# Patient Record
Sex: MALE | Race: WHITE | Employment: FULL TIME | URBAN - METROPOLITAN AREA
[De-identification: names, ages, dates, MRNs, and addresses within clinical notes are randomized per-mention and may not be internally consistent; named-entity substitution may affect disease eponyms.]

---

## 2022-04-03 ENCOUNTER — OFFICE VISIT (OUTPATIENT)
Dept: URGENT CARE | Facility: URGENT CARE | Age: 52
End: 2022-04-03
Payer: COMMERCIAL

## 2022-04-03 VITALS
DIASTOLIC BLOOD PRESSURE: 80 MMHG | OXYGEN SATURATION: 99 % | SYSTOLIC BLOOD PRESSURE: 129 MMHG | HEART RATE: 61 BPM | TEMPERATURE: 98.3 F | RESPIRATION RATE: 16 BRPM

## 2022-04-03 DIAGNOSIS — R19.7 DIARRHEA OF PRESUMED INFECTIOUS ORIGIN: Primary | ICD-10-CM

## 2022-04-03 PROCEDURE — 99203 OFFICE O/P NEW LOW 30 MIN: CPT | Performed by: FAMILY MEDICINE

## 2022-04-03 NOTE — PATIENT INSTRUCTIONS
Collect stool sample    Start clear liquid diet-water, Gatorade, ginger ale, Sprite, 7-Up, broth, Jell-O, popsicles until a.m.    Starting tomorrow start the BRAT diet Bananas Rice Applesauce and Toast--can also try other bland foods as tolerated crackers, chicken soup, scrambled eggs-cooked in light butter, no heavy oils/grease    No dairy products, no heavy foods, no spicy foods, no fried foods until diarrhea resolves    Abdominal pain worsens, fever,  start to vomit and can not keep anything down, , bloody stool or black stools to ER        Patient Education     Diet for Vomiting or Diarrhea (Adult)     Your symptoms may return or get worse after eating certain foods listed below. If this happens, stop eating these foods until your symptoms ease and you feel better.  Once the vomiting stops, follow the steps below.   During the first 12 to 24 hours  During the first 12 to 24 hours, follow this diet:    Drinks. Plain water, sport drinks like electrolyte solutions, drinks without caffeine, mineral water (plain or flavored), and clear fruit juices. Don't have drinks with caffeine or citrus juices. This is because they are high in acid and can irritate your stomach.    Soups. Clear broth.    Desserts. Plain gelatin, frozen ice pops, and fruit juice bars without pieces of fruit. As you feel better, you may add 6 to 8 ounces of yogurt per day. If you have diarrhea, don't have foods or drinks with sugar, high-fructose corn syrup, or sugar alcohols.  During the next 24 hours  During the next 24 hours you may add the following to the above:    Hot cereal, plain toast, bread, rolls, and crackers    Plain noodles, rice, mashed potatoes, and chicken noodle or rice soup    Unsweetened canned fruit (but not pineapple) and bananas  Don't eat more than 15 grams of fat a day. Do this by staying away from margarine, butter, oils, mayonnaise, sauces, gravies, fried foods, peanut butter, meat, poultry, and fish.  Don't eat much  fiber. Stay away from raw or cooked vegetables, fresh fruits (except bananas), and bran cereals.  Limit how much caffeine and chocolate you have. Do' use any spices or seasonings except salt.  During the next 24 hours  Slowly go back to your normal diet, as you feel better and your symptoms ease.  [employer] last reviewed this educational content on 9/1/2019 2000-2021 [employer]. All rights reserved. This information is not intended as a substitute for professional medical care. Always follow your healthcare professional's instructions.

## 2022-04-03 NOTE — PROGRESS NOTES
Patient presents with:  Urgent Care  Gastric Problem: Recent travel to [place]-Diarrhea, lathargic and stomach ache        Subjective     Jassi Bonilla is a 51 year old male who presents to clinic today for the following health issues:    HPI       Diarrhea      Duration: 5 days-onset in [place]    Description:       Consistency of stool: loose-less loose first stool in am        Blood in stool: no, no melena        Number of loose stools past 24 hours: 5-6    Intensity:  moderate    Accompanying signs and symptoms:       Fever: no - fever the first 3 days -low grade/chills        Nausea/vomitting: YES- emesis first night, no nausea        Abdominal pain: no        Weight loss: no     History (recent antibiotics or travel/ill contacts/med changes/testing done): [facility] lab test for covid, negative, test prior to leaving was negative     Precipitating or alleviating factors: None    Therapies tried and outcome: eating regular foods         Past Medical History:   Diagnosis Date     Allergic rhinitis, cause unspecified     claritin seems to help     Social History     Tobacco Use     Smoking status: Never Smoker     Smokeless tobacco: Never Used   Substance Use Topics     Alcohol use: Yes     Comment: rare       Current Outpatient Medications   Medication Sig Dispense Refill     atovaquone-proguanil (MALARONE) 250-100 MG per tablet Take 1 tablet by mouth daily. Start 2 days before travel and continue 7 days after return.   (Patient not taking: Reported on 4/3/2022) 18 tablet 0     ciprofloxacin (CIPRO) 500 MG tablet Take 1 tablet by mouth 2 times daily. (Patient not taking: Reported on 4/3/2022) 6 tablet 0     Allergies   Allergen Reactions     Amoxicillin              ROS are negative, except as otherwise noted HPI      Objective    /80   Pulse 61   Temp 98.3  F (36.8  C) (Oral)   Resp 16   SpO2 99%   There is no height or weight on file to calculate BMI.  Physical Exam   GENERAL:, alert and no  distress  ABDOMEN: soft, nontender, no hepatosplenomegaly, no masses and bowel sounds normal  MS: no gross musculoskeletal defects noted, no edema  NEURO: Normal strength and tone, mentation intact and speech normal      Diagnostic Test Results:  Labs reviewed in Epic  No results found for any visits on 04/03/22.        ASSESSMENT/PLAN:      ICD-10-CM    1. Diarrhea of presumed infectious origin  R19.7 Clostridium difficile Toxin B PCR     Ova and Parasite Exam Routine     Enteric Bacteria and Virus Panel by ECHO Stool     Clostridium difficile Toxin B PCR     Ova and Parasite Exam Routine     Enteric Bacteria and Virus Panel by ECHO Stool         Reviewed medication instructions and side effects. Follow up if experiences side effects.     I reviewed supportive care, otc meds to use if needed, expected course, and signs of concern.  Follow up as needed or if he does not improve within  1-2 days or if worsens in any way.  Reviewed red flag symptoms and is to go to the ER if experiences any of these.     The use of Dragon/[employer] services may have been used to construct the content in this note; any grammatical or spelling errors are non-intentional. Please contact the author of this note directly if you are in need of any clarification.      On the day of the encounter, time spend on chart review, patient visit, review of testing, documentation was 30  minutes          Patient Instructions     Collect stool sample    Start clear liquid diet-water, Gatorade, ginger ale, Sprite, 7-Up, broth, Jell-O, popsicles until a.m.    Starting tomorrow start the BRAT diet Bananas Rice Applesauce and Toast--can also try other bland foods as tolerated crackers, chicken soup, scrambled eggs-cooked in light butter, no heavy oils/grease    No dairy products, no heavy foods, no spicy foods, no fried foods until diarrhea resolves    Abdominal pain worsens, fever,  start to vomit and can not keep anything down, , bloody stool or  black stools to ER        Patient Education     Diet for Vomiting or Diarrhea (Adult)     Your symptoms may return or get worse after eating certain foods listed below. If this happens, stop eating these foods until your symptoms ease and you feel better.  Once the vomiting stops, follow the steps below.   During the first 12 to 24 hours  During the first 12 to 24 hours, follow this diet:    Drinks. Plain water, sport drinks like electrolyte solutions, drinks without caffeine, mineral water (plain or flavored), and clear fruit juices. Don't have drinks with caffeine or citrus juices. This is because they are high in acid and can irritate your stomach.    Soups. Clear broth.    Desserts. Plain gelatin, frozen ice pops, and fruit juice bars without pieces of fruit. As you feel better, you may add 6 to 8 ounces of yogurt per day. If you have diarrhea, don't have foods or drinks with sugar, high-fructose corn syrup, or sugar alcohols.  During the next 24 hours  During the next 24 hours you may add the following to the above:    Hot cereal, plain toast, bread, rolls, and crackers    Plain noodles, rice, mashed potatoes, and chicken noodle or rice soup    Unsweetened canned fruit (but not pineapple) and bananas  Don't eat more than 15 grams of fat a day. Do this by staying away from margarine, butter, oils, mayonnaise, sauces, gravies, fried foods, peanut butter, meat, poultry, and fish.  Don't eat much fiber. Stay away from raw or cooked vegetables, fresh fruits (except bananas), and bran cereals.  Limit how much caffeine and chocolate you have. Do' use any spices or seasonings except salt.  During the next 24 hours  Slowly go back to your normal diet, as you feel better and your symptoms ease.  [employer] last reviewed this educational content on 9/1/2019 2000-2021 [employer]. All rights reserved. This information is not intended as a substitute for professional medical care. Always follow your  healthcare professional's instructions.

## 2022-04-04 ENCOUNTER — APPOINTMENT (OUTPATIENT)
Dept: LAB | Facility: CLINIC | Age: 52
End: 2022-04-04
Payer: COMMERCIAL

## 2022-04-04 LAB
C COLI+JEJUNI+LARI FUSA STL QL NAA+PROBE: NOT DETECTED
C DIFF TOX B STL QL: NEGATIVE
EC STX1 GENE STL QL NAA+PROBE: NOT DETECTED
EC STX2 GENE STL QL NAA+PROBE: NOT DETECTED
NOROV GI+II ORF1-ORF2 JNC STL QL NAA+PR: NOT DETECTED
RVA NSP5 STL QL NAA+PROBE: NOT DETECTED
SALMONELLA SP RPOD STL QL NAA+PROBE: NOT DETECTED
SHIGELLA SP+EIEC IPAH STL QL NAA+PROBE: NOT DETECTED
V CHOL+PARA RFBL+TRKH+TNAA STL QL NAA+PR: NOT DETECTED
Y ENTERO RECN STL QL NAA+PROBE: NOT DETECTED

## 2022-04-04 PROCEDURE — 87209 SMEAR COMPLEX STAIN: CPT | Performed by: FAMILY MEDICINE

## 2022-04-04 PROCEDURE — 87506 IADNA-DNA/RNA PROBE TQ 6-11: CPT | Mod: 59 | Performed by: FAMILY MEDICINE

## 2022-04-04 PROCEDURE — 87493 C DIFF AMPLIFIED PROBE: CPT | Performed by: FAMILY MEDICINE

## 2022-04-04 PROCEDURE — 87177 OVA AND PARASITES SMEARS: CPT | Performed by: FAMILY MEDICINE

## 2022-04-05 LAB — O+P STL MICRO: NEGATIVE

## 2022-06-25 ENCOUNTER — HEALTH MAINTENANCE LETTER (OUTPATIENT)
Age: 52
End: 2022-06-25

## 2022-11-20 ENCOUNTER — HEALTH MAINTENANCE LETTER (OUTPATIENT)
Age: 52
End: 2022-11-20

## 2023-07-08 ENCOUNTER — HEALTH MAINTENANCE LETTER (OUTPATIENT)
Age: 53
End: 2023-07-08

## 2024-08-31 ENCOUNTER — HEALTH MAINTENANCE LETTER (OUTPATIENT)
Age: 54
End: 2024-08-31

## 2024-10-17 ENCOUNTER — LAB REQUISITION (OUTPATIENT)
Dept: LAB | Facility: CLINIC | Age: 54
End: 2024-10-17

## 2024-10-17 ENCOUNTER — LAB REQUISITION (OUTPATIENT)
Dept: LAB | Facility: CLINIC | Age: 54
End: 2024-10-17
Payer: COMMERCIAL

## 2024-10-17 DIAGNOSIS — Z12.11 ENCOUNTER FOR SCREENING FOR MALIGNANT NEOPLASM OF COLON: ICD-10-CM

## 2024-10-17 DIAGNOSIS — N40.0 BENIGN PROSTATIC HYPERPLASIA WITHOUT LOWER URINARY TRACT SYMPTOMS: ICD-10-CM

## 2024-10-17 PROCEDURE — 84153 ASSAY OF PSA TOTAL: CPT | Performed by: STUDENT IN AN ORGANIZED HEALTH CARE EDUCATION/TRAINING PROGRAM

## 2024-10-18 LAB — PSA SERPL DL<=0.01 NG/ML-MCNC: 0.59 NG/ML (ref 0–3.5)

## 2024-10-18 PROCEDURE — 82274 ASSAY TEST FOR BLOOD FECAL: CPT | Mod: ORL | Performed by: STUDENT IN AN ORGANIZED HEALTH CARE EDUCATION/TRAINING PROGRAM

## 2024-10-22 LAB — HEMOCCULT STL QL IA: NEGATIVE
